# Patient Record
Sex: FEMALE | Race: OTHER | Employment: UNEMPLOYED | URBAN - METROPOLITAN AREA
[De-identification: names, ages, dates, MRNs, and addresses within clinical notes are randomized per-mention and may not be internally consistent; named-entity substitution may affect disease eponyms.]

---

## 2021-09-16 ENCOUNTER — HOSPITAL ENCOUNTER (EMERGENCY)
Age: 31
Discharge: HOME | End: 2021-09-17
Attending: EMERGENCY MEDICINE | Admitting: EMERGENCY MEDICINE

## 2021-09-16 ENCOUNTER — APPOINTMENT (OUTPATIENT)
Dept: CT IMAGING | Age: 31
End: 2021-09-16
Attending: EMERGENCY MEDICINE

## 2021-09-16 DIAGNOSIS — N20.0 KIDNEY STONE: Primary | ICD-10-CM

## 2021-09-16 DIAGNOSIS — R10.9 ACUTE RIGHT FLANK PAIN: ICD-10-CM

## 2021-09-16 DIAGNOSIS — R11.2 NON-INTRACTABLE VOMITING WITH NAUSEA, UNSPECIFIED VOMITING TYPE: ICD-10-CM

## 2021-09-16 LAB
ALBUMIN SERPL-MCNC: 3.1 G/DL (ref 3.5–5)
ALBUMIN/GLOB SERPL: 0.7 {RATIO} (ref 1.1–2.2)
ALP SERPL-CCNC: 102 U/L (ref 45–117)
ALT SERPL-CCNC: 12 U/L (ref 12–78)
ANION GAP SERPL CALC-SCNC: 5 MMOL/L (ref 5–15)
APPEARANCE UR: ABNORMAL
AST SERPL-CCNC: 16 U/L (ref 15–37)
BACTERIA URNS QL MICRO: NEGATIVE /HPF
BASOPHILS # BLD: 0.1 K/UL (ref 0–0.1)
BASOPHILS NFR BLD: 1 % (ref 0–1)
BILIRUB SERPL-MCNC: 0.2 MG/DL (ref 0.2–1)
BILIRUB UR QL: NEGATIVE
BUN SERPL-MCNC: 11 MG/DL (ref 6–20)
BUN/CREAT SERPL: 15 (ref 12–20)
CALCIUM SERPL-MCNC: 7.5 MG/DL (ref 8.5–10.1)
CHLORIDE SERPL-SCNC: 109 MMOL/L (ref 97–108)
CO2 SERPL-SCNC: 26 MMOL/L (ref 21–32)
COLOR UR: ABNORMAL
CREAT SERPL-MCNC: 0.72 MG/DL (ref 0.55–1.02)
DIFFERENTIAL METHOD BLD: ABNORMAL
EOSINOPHIL # BLD: 0.2 K/UL (ref 0–0.4)
EOSINOPHIL NFR BLD: 2 % (ref 0–7)
EPITH CASTS URNS QL MICRO: ABNORMAL /LPF
ERYTHROCYTE [DISTWIDTH] IN BLOOD BY AUTOMATED COUNT: 16.2 % (ref 11.5–14.5)
GLOBULIN SER CALC-MCNC: 4.6 G/DL (ref 2–4)
GLUCOSE SERPL-MCNC: 103 MG/DL (ref 65–100)
GLUCOSE UR STRIP.AUTO-MCNC: NEGATIVE MG/DL
HCG UR QL: NEGATIVE
HCT VFR BLD AUTO: 36.9 % (ref 35–47)
HGB BLD-MCNC: 11.4 G/DL (ref 11.5–16)
HGB UR QL STRIP: ABNORMAL
IMM GRANULOCYTES # BLD AUTO: 0 K/UL (ref 0–0.04)
IMM GRANULOCYTES NFR BLD AUTO: 0 % (ref 0–0.5)
KETONES UR QL STRIP.AUTO: NEGATIVE MG/DL
LEUKOCYTE ESTERASE UR QL STRIP.AUTO: ABNORMAL
LIPASE SERPL-CCNC: 112 U/L (ref 73–393)
LYMPHOCYTES # BLD: 2.8 K/UL (ref 0.8–3.5)
LYMPHOCYTES NFR BLD: 28 % (ref 12–49)
MCH RBC QN AUTO: 22.5 PG (ref 26–34)
MCHC RBC AUTO-ENTMCNC: 30.9 G/DL (ref 30–36.5)
MCV RBC AUTO: 72.9 FL (ref 80–99)
MONOCYTES # BLD: 0.8 K/UL (ref 0–1)
MONOCYTES NFR BLD: 7 % (ref 5–13)
NEUTS SEG # BLD: 6.3 K/UL (ref 1.8–8)
NEUTS SEG NFR BLD: 62 % (ref 32–75)
NITRITE UR QL STRIP.AUTO: NEGATIVE
NRBC # BLD: 0 K/UL (ref 0–0.01)
NRBC BLD-RTO: 0 PER 100 WBC
PH UR STRIP: 7 [PH] (ref 5–8)
PLATELET # BLD AUTO: 347 K/UL (ref 150–400)
PMV BLD AUTO: 10.1 FL (ref 8.9–12.9)
POTASSIUM SERPL-SCNC: 3.3 MMOL/L (ref 3.5–5.1)
PROT SERPL-MCNC: 7.7 G/DL (ref 6.4–8.2)
PROT UR STRIP-MCNC: 30 MG/DL
RBC # BLD AUTO: 5.06 M/UL (ref 3.8–5.2)
RBC #/AREA URNS HPF: >100 /HPF (ref 0–5)
SODIUM SERPL-SCNC: 140 MMOL/L (ref 136–145)
SP GR UR REFRACTOMETRY: 1.02 (ref 1–1.03)
UR CULT HOLD, URHOLD: NORMAL
UROBILINOGEN UR QL STRIP.AUTO: 1 EU/DL (ref 0.2–1)
WBC # BLD AUTO: 10.1 K/UL (ref 3.6–11)
WBC URNS QL MICRO: ABNORMAL /HPF (ref 0–4)

## 2021-09-16 PROCEDURE — 74176 CT ABD & PELVIS W/O CONTRAST: CPT

## 2021-09-16 PROCEDURE — 99285 EMERGENCY DEPT VISIT HI MDM: CPT

## 2021-09-16 PROCEDURE — 80053 COMPREHEN METABOLIC PANEL: CPT

## 2021-09-16 PROCEDURE — 96374 THER/PROPH/DIAG INJ IV PUSH: CPT

## 2021-09-16 PROCEDURE — 81001 URINALYSIS AUTO W/SCOPE: CPT

## 2021-09-16 PROCEDURE — 81025 URINE PREGNANCY TEST: CPT

## 2021-09-16 PROCEDURE — 74011250636 HC RX REV CODE- 250/636: Performed by: EMERGENCY MEDICINE

## 2021-09-16 PROCEDURE — 85025 COMPLETE CBC W/AUTO DIFF WBC: CPT

## 2021-09-16 PROCEDURE — 96375 TX/PRO/DX INJ NEW DRUG ADDON: CPT

## 2021-09-16 PROCEDURE — 36415 COLL VENOUS BLD VENIPUNCTURE: CPT

## 2021-09-16 PROCEDURE — 96376 TX/PRO/DX INJ SAME DRUG ADON: CPT

## 2021-09-16 PROCEDURE — 83690 ASSAY OF LIPASE: CPT

## 2021-09-16 RX ORDER — HYDROMORPHONE HYDROCHLORIDE 1 MG/ML
1 INJECTION, SOLUTION INTRAMUSCULAR; INTRAVENOUS; SUBCUTANEOUS ONCE
Status: COMPLETED | OUTPATIENT
Start: 2021-09-16 | End: 2021-09-16

## 2021-09-16 RX ORDER — TAMSULOSIN HYDROCHLORIDE 0.4 MG/1
0.4 CAPSULE ORAL DAILY
Qty: 7 CAPSULE | Refills: 0 | Status: SHIPPED | OUTPATIENT
Start: 2021-09-16 | End: 2021-09-23

## 2021-09-16 RX ORDER — FENTANYL CITRATE 50 UG/ML
50 INJECTION, SOLUTION INTRAMUSCULAR; INTRAVENOUS ONCE
Status: COMPLETED | OUTPATIENT
Start: 2021-09-16 | End: 2021-09-16

## 2021-09-16 RX ORDER — HYDROCODONE BITARTRATE AND ACETAMINOPHEN 5; 325 MG/1; MG/1
1 TABLET ORAL
Qty: 10 TABLET | Refills: 0 | Status: SHIPPED | OUTPATIENT
Start: 2021-09-16 | End: 2021-09-19

## 2021-09-16 RX ORDER — KETOROLAC TROMETHAMINE 30 MG/ML
15 INJECTION, SOLUTION INTRAMUSCULAR; INTRAVENOUS
Status: COMPLETED | OUTPATIENT
Start: 2021-09-16 | End: 2021-09-16

## 2021-09-16 RX ORDER — ONDANSETRON 4 MG/1
4 TABLET, ORALLY DISINTEGRATING ORAL
Qty: 10 TABLET | Refills: 0 | Status: SHIPPED | OUTPATIENT
Start: 2021-09-16 | End: 2022-01-12 | Stop reason: ALTCHOICE

## 2021-09-16 RX ORDER — ONDANSETRON 2 MG/ML
4 INJECTION INTRAMUSCULAR; INTRAVENOUS
Status: COMPLETED | OUTPATIENT
Start: 2021-09-16 | End: 2021-09-16

## 2021-09-16 RX ADMIN — ONDANSETRON 4 MG: 2 INJECTION INTRAMUSCULAR; INTRAVENOUS at 21:09

## 2021-09-16 RX ADMIN — HYDROMORPHONE HYDROCHLORIDE 1 MG: 1 INJECTION, SOLUTION INTRAMUSCULAR; INTRAVENOUS; SUBCUTANEOUS at 23:02

## 2021-09-16 RX ADMIN — SODIUM CHLORIDE 1000 ML: 9 INJECTION, SOLUTION INTRAVENOUS at 21:29

## 2021-09-16 RX ADMIN — KETOROLAC TROMETHAMINE 15 MG: 30 INJECTION, SOLUTION INTRAMUSCULAR at 21:08

## 2021-09-16 RX ADMIN — FENTANYL CITRATE 50 MCG: 50 INJECTION INTRAMUSCULAR; INTRAVENOUS at 21:09

## 2021-09-17 VITALS
SYSTOLIC BLOOD PRESSURE: 107 MMHG | TEMPERATURE: 98.3 F | WEIGHT: 170 LBS | HEART RATE: 73 BPM | RESPIRATION RATE: 17 BRPM | DIASTOLIC BLOOD PRESSURE: 79 MMHG | HEIGHT: 60 IN | BODY MASS INDEX: 33.38 KG/M2 | OXYGEN SATURATION: 97 %

## 2021-09-17 NOTE — ED NOTES
Dr. Melodie Francis reviewed discharge instructions with the patient. The patient verbalized understanding. The patient was given opportunity for questions. Patient discharged in stable condition to the waiting room via ambulatory with male visitor.

## 2021-09-17 NOTE — ED PROVIDER NOTES
64-year-old female with a history of prior kidney stones that have been able to pass without issue or operative management previously presents to the emergency department noting the acute onset of right flank pain radiating down to her groin with associated nausea and vomiting which started about 30 minutes prior to arrival.  States that she was feeling fine prior to the onset of her symptoms. She rates her pain as 10/10 in severity and on arrival she is in distress due to pain. Denies any fever, chills, dysuria, diarrhea, blood in stool or any noted blood in urine. Denies any other URI symptoms. The history is provided by the patient. The history is limited by a language barrier. A  was used. No past medical history on file. No past surgical history on file. No family history on file. Social History     Socioeconomic History    Marital status:      Spouse name: Not on file    Number of children: Not on file    Years of education: Not on file    Highest education level: Not on file   Occupational History    Not on file   Tobacco Use    Smoking status: Not on file   Substance and Sexual Activity    Alcohol use: Not on file    Drug use: Not on file    Sexual activity: Not on file   Other Topics Concern    Not on file   Social History Narrative    Not on file     Social Determinants of Health     Financial Resource Strain:     Difficulty of Paying Living Expenses:    Food Insecurity:     Worried About Running Out of Food in the Last Year:     [address] in the Last Year:    Transportation Needs:     Lack of Transportation (Medical):      Lack of Transportation (Non-Medical):    Physical Activity:     Days of Exercise per Week:     Minutes of Exercise per Session:    Stress:     Feeling of Stress :    Social Connections:     Frequency of Communication with Friends and Family:     Frequency of Social Gatherings with Friends and Family:     Attends Congregational Services:     Active Member of Clubs or Organizations:     Attends Club or Organization Meetings:     Marital Status:    Intimate Partner Violence:     Fear of Current or Ex-Partner:     Emotionally Abused:     Physically Abused:     Sexually Abused: ALLERGIES: Ibuprofen    Review of Systems   Constitutional: Positive for activity change and appetite change. Negative for chills and fever. HENT: Negative for congestion, rhinorrhea, sinus pressure, sneezing and sore throat. Eyes: Negative for photophobia and visual disturbance. Respiratory: Negative for cough and shortness of breath. Cardiovascular: Negative for chest pain. Gastrointestinal: Positive for abdominal pain, nausea and vomiting. Negative for blood in stool, constipation and diarrhea. Genitourinary: Positive for flank pain. Negative for difficulty urinating, dysuria, frequency, hematuria, menstrual problem, urgency, vaginal bleeding and vaginal discharge. Musculoskeletal: Negative for arthralgias, back pain, myalgias and neck pain. Skin: Negative for rash and wound. Neurological: Negative for syncope, weakness, numbness and headaches. Psychiatric/Behavioral: Negative for self-injury and suicidal ideas. All other systems reviewed and are negative. Vitals:    09/16/21 2140 09/16/21 2154 09/16/21 2200 09/16/21 2245   BP: (!) 133/94 111/76 124/68 130/81   Pulse:  81     Resp:  16     Temp:  98.4 °F (36.9 °C)     SpO2: 99% 100% 100% 100%   Weight:       Height:                Physical Exam  Vitals and nursing note reviewed. Constitutional:       General: She is in acute distress (Due to pain). Appearance: Normal appearance. She is well-developed. She is not diaphoretic. HENT:      Head: Normocephalic and atraumatic. Nose: Nose normal.   Eyes:      Extraocular Movements: Extraocular movements intact.       Conjunctiva/sclera: Conjunctivae normal.      Pupils: Pupils are equal, round, and reactive to light. Cardiovascular:      Rate and Rhythm: Normal rate and regular rhythm. Heart sounds: Normal heart sounds. Pulmonary:      Effort: Pulmonary effort is normal.      Breath sounds: Normal breath sounds. Abdominal:      General: There is no distension. Palpations: Abdomen is soft. Tenderness: There is abdominal tenderness in the right lower quadrant and suprapubic area. There is right CVA tenderness and guarding. There is no left CVA tenderness or rebound. Positive signs include McBurney's sign. Negative signs include Clemens's sign. Musculoskeletal:         General: No tenderness. Cervical back: Neck supple. Skin:     General: Skin is warm and dry. Neurological:      General: No focal deficit present. Mental Status: She is alert and oriented to person, place, and time. Cranial Nerves: No cranial nerve deficit. Sensory: No sensory deficit. Motor: No weakness. Coordination: Coordination normal.          MDM   41-year-old female with a history of prior kidney stones presents with concern for the same. She is afebrile with vital signs stable initially and distress due to pain. Treated symptomatically and had significant improvement in her symptoms. hCG negative  UA returned showing large blood, moderate leuk esterase, 10-20 WBCs but no bacteria. Labs returned showing no leukocytosis, normal creatinine, and LFTs. CT abdomen pelvis shows 7 mm calculus in the distal right ureter with moderate right hydroureteronephrosis. Given slightly abnormal UA results and large size of kidney stone her case was discussed with Dr. Alfredo Cruz,, urology. Who recommends close follow-up in clinic and hopefully the stone will pass on its own., as she is feeling significantly better after medications feel that she can be discharged home. Does not feel that she requires antibiotics at this time.   Will Rx Norco, Zofran, Flomax and recommended close urology follow-up. Return precautions were given for worsening or concerns. This plan was discussed with the patient and her  at the bedside and they stated both understanding and agreement.   Procedures

## 2021-09-17 NOTE — ED TRIAGE NOTES
Pt wheeled into room 22 for triage d/t intense pain. Pt states that about 30 minutes ago she was drinking a cup of coffee when she began having R sided flank pain. She rates her pain at 10/10 with nausea and vomiting. She states she has a hx of kidney stones and states she believes it's the same.

## 2021-09-17 NOTE — ED NOTES
This is a 75-year-old female patient who came into the ER with a complaint of right sided flank pain that started today at approximately 2000 today.

## 2022-01-11 ENCOUNTER — VIRTUAL VISIT (OUTPATIENT)
Dept: FAMILY MEDICINE CLINIC | Age: 32
End: 2022-01-11

## 2022-01-11 DIAGNOSIS — M79.641 PAIN OF RIGHT HAND: Primary | ICD-10-CM

## 2022-01-11 PROCEDURE — 99442 PR PHYS/QHP TELEPHONE EVALUATION 11-20 MIN: CPT | Performed by: NURSE PRACTITIONER

## 2022-01-11 NOTE — PATIENT INSTRUCTIONS
Carpal Tunnel Syndrome: Care Instructions  Overview     Carpal tunnel syndrome is numbness, tingling, weakness, and pain in your hand, wrist, and sometimes forearm. It is caused by pressure on the median nerve. This nerve and several tough tissues called tendons run through a space in the wrist. This space is called the carpal tunnel. The repeated hand motions used in work and some hobbies and sports can put pressure on the median nerve. Pregnancy can cause carpal tunnel syndrome. Several conditions, such as diabetes, arthritis, and an underactive thyroid, can also cause it. You may be able to limit an activity or change the way you do it to reduce your symptoms. You also can take other steps to feel better. If your symptoms are mild, 1 to 2 weeks of home treatment are likely to ease your pain. Surgery is needed only if other treatments do not work. Follow-up care is a key part of your treatment and safety. Be sure to make and go to all appointments, and call your doctor if you are having problems. It's also a good idea to know your test results and keep a list of the medicines you take. How can you care for yourself at home? · If possible, stop or reduce the activity that causes your symptoms. If you cannot stop the activity, take frequent breaks to rest and stretch or change hand positions to do a task. Try switching hands, such as when using a computer mouse. · Try to avoid bending or twisting your wrists. · Ask your doctor if you can take an over-the-counter pain medicine, such as acetaminophen (Tylenol), ibuprofen (Advil, Motrin), or naproxen (Aleve). Be safe with medicines. Read and follow all instructions on the label. · If your doctor prescribes corticosteroid medicine to help reduce pain and swelling, take it exactly as prescribed. Call your doctor if you think you are having a problem with your medicine. · Put ice or a cold pack on your wrist for 10 to 20 minutes at a time to ease pain.  Put a thin cloth between the ice and your skin. · If your doctor or your physical or occupational therapist tells you to wear a wrist splint, wear it as directed to keep your wrist in a neutral position. This also eases pressure on your median nerve. · Ask your doctor whether you should have physical or occupational therapy to learn how to do tasks differently. · Try a yoga class to stretch your muscles and build strength in your hands and wrists. Yoga has been shown to ease carpal tunnel symptoms. To prevent carpal tunnel  · When working at a computer, keep your hands and wrists in line with your forearms. Hold your elbows close to your sides. Take a break every 10 to 15 minutes. · Try these exercises:  ? Warm up: Rotate your wrist up, down, and from side to side. Repeat this 4 times. Stretch your fingers far apart, relax them, then stretch them again. Repeat 4 times. Stretch your thumb by pulling it back gently, holding it, and then releasing it. Repeat 4 times. ? Prayer stretch: Start with your palms together in front of your chest just below your chin. Slowly lower your hands toward your waistline while keeping your hands close to your stomach and your palms together until you feel a mild to moderate stretch under your forearms. Hold for 10 to 20 seconds. Repeat 4 times. ? Wrist flexor stretch: Hold your arm in front of you with your palm up. Bend your wrist, pointing your hand toward the floor. With your other hand, gently bend your wrist further until you feel a mild to moderate stretch in your forearm. Hold for 10 to 20 seconds. Repeat 4 times. ? Wrist extensor stretch: Repeat the steps for the wrist flexor stretch, but begin with your extended hand palm down. · Squeeze a rubber exercise ball several times a day to keep your hands and fingers strong. · Avoid holding objects (such as a book) in one position for a long time. When possible, use your whole hand to grasp an object.  Using just the thumb and index finger can put stress on the wrist.  · Do not smoke. It can make this condition worse by reducing blood flow to the median nerve. If you need help quitting, talk to your doctor about stop-smoking programs and medicines. These can increase your chances of quitting for good. When should you call for help? Watch closely for changes in your health, and be sure to contact your doctor if:    · Your pain or other problems do not get better with home care.     · You want more information about physical or occupational therapy.     · You have side effects of your corticosteroid medicine, such as:  ? Weight gain. ? Mood changes. ? Trouble sleeping. ? Bruising easily.     · You have any other problems with your medicine. Where can you learn more? Go to http://www.gray.com/  Enter R432 in the search box to learn more about \"Carpal Tunnel Syndrome: Care Instructions. \"  Current as of: July 1, 2021               Content Version: 13.0  © 6984-4860 [employer]. Care instructions adapted under license by [employer] (which disclaims liability or warranty for this information). If you have questions about a medical condition or this instruction, always ask your healthcare professional. [place] [address] any warranty or liability for your use of this information.

## 2022-01-11 NOTE — PROGRESS NOTES
: Queen Issac  Patient identification verified with 2 identifiers. Consent: She and/or health care decision maker has provided verbal consent to proceed: Yes   Total Time: minutes: 11-20 minutes  Pursuant to the emergency declaration under the [address], [facility] authority and the [employer] Act, this Virtual Telephone Visit was conducted to reduce the patient's risk of exposure to COVID-19. Assessment/Plan:   Diagnoses and all orders for this visit:    1. Pain of right hand      Follow-up and Dispositions    · Return for F2F 2 weeks any provider (arm/hand pain/numbness); wait list for [facility]'s pap smear clinic.     -[facility] pap smear clinic      Subjective:   Soledad Awan is a 33 y.o. female evaluated via telephone on 1/11/2022. Chief Complaint   Patient presents with    Arm Pain     Pt c/o right arm pain x 2 years with numbness. Patient works as a     Well Woman     Pt is requesting pap-smear information. Last time was done 7 years ago      History of Present Illness  Began 2 years ago. Starts in the shoulder, travels to her hand. Right side. When she sleeps, can't sleep on this side. Sleeps on the left side. Also happens when she is holding the phone. Has the pain in the arm all the time. There is no good position for this. Bothers her sleep and also with work. She has used creams (Cofal) which don't help. When she tightens her hand she has a little relief. No back pain currently. Pain is currently in the hand. Objective:     Current Outpatient Medications   Medication Sig    ondansetron (Zofran ODT) 4 mg disintegrating tablet Take 1 Tablet by mouth every eight (8) hours as needed for Nausea. No current facility-administered medications for this visit.    Carpal tunnel wrist splint  Tylenol 500 mg bid  F2F 2 weeks any provider (arm/hand pain/numbness)    No physical exam performed due to telephone visit. I affirm this is a Patient Initiated Episode with a Patient who has not had a related appointment within my department in the past 7 days or scheduled within the next 24 hours. Delano Watt, ANTHONY Fowler expressed understanding and agreed to this plan.

## 2022-01-11 NOTE — PROGRESS NOTES
Tc to the pt 2x to go over the discharge instructions wit the pt. The instructions were texted over to her phone from the cbn phone. The pictures of the wrist splint and Carpal tunnel syndrome instructions were texted. No one answered the pone. No personal PHI was texted. Just the instructions and the photo of the wrist splints were sent in English, per the providers instructions. The phone does not have an VM box set up and there is no opportunity to leave a message. Shara Murphy RN    The pt returned the call. Int # [id number]. Reviewed the instructions for Carpal tunnel with the pt she was re-sent the Carpal tunnel instructions in Czech. The pt confirmed receipt of the instructions on her phone. She was told she is recommended to purchase the splint from her Pharmacy. She may show the Pharmacy staff the picture and they can help her find it. The pt was told the registrar would contact her to schedule her f/u appt. The pt verbalized understanding.  Shara Murphy RN

## 2022-01-25 ENCOUNTER — OFFICE VISIT (OUTPATIENT)
Dept: FAMILY MEDICINE CLINIC | Age: 32
End: 2022-01-25

## 2022-01-25 VITALS
BODY MASS INDEX: 36.11 KG/M2 | SYSTOLIC BLOOD PRESSURE: 124 MMHG | HEIGHT: 58 IN | DIASTOLIC BLOOD PRESSURE: 80 MMHG | HEART RATE: 79 BPM | WEIGHT: 172 LBS | OXYGEN SATURATION: 100 % | TEMPERATURE: 97.7 F

## 2022-01-25 DIAGNOSIS — G89.29 CHRONIC RIGHT SHOULDER PAIN: Primary | ICD-10-CM

## 2022-01-25 DIAGNOSIS — M25.511 CHRONIC RIGHT SHOULDER PAIN: Primary | ICD-10-CM

## 2022-01-25 PROCEDURE — 99214 OFFICE O/P EST MOD 30 MIN: CPT | Performed by: NURSE PRACTITIONER

## 2022-01-25 RX ORDER — PREDNISONE 10 MG/1
TABLET ORAL
Qty: 63 TABLET | Refills: 0 | Status: SHIPPED | OUTPATIENT
Start: 2022-01-25 | End: 2022-06-06 | Stop reason: SINTOL

## 2022-01-25 NOTE — PROGRESS NOTES
Coordination of Care  1. Have you been to the ER, urgent care clinic since your last visit? Hospitalized since your last visit? No    2. Have you seen or consulted any other health care providers outside of the [address] [place] [address] since your last visit? Include any pap smears or colon screening. No    Does the patient need refills? NO    Learning Assessment Complete?  yes  Depression Screening complete in the past 12 months? yes

## 2022-01-25 NOTE — PROGRESS NOTES
An After Visit Summary was printed and given to the patient. Went over instructions on how to take medication - 6 po qd x 3d; 5 po qd x 3d; 4 po qd x 3d; 3 po qd x 3d; 2 po qd x 3d; 1 po qd x 3d - Patient was also Instructed to scheduled a f/u appointment in 3 weeks. Patient verbalized understanding of instructions.   Arnel Mcginnis RN

## 2022-02-22 ENCOUNTER — OFFICE VISIT (OUTPATIENT)
Dept: FAMILY MEDICINE CLINIC | Age: 32
End: 2022-02-22

## 2022-02-22 VITALS
SYSTOLIC BLOOD PRESSURE: 115 MMHG | HEART RATE: 79 BPM | BODY MASS INDEX: 36.98 KG/M2 | OXYGEN SATURATION: 100 % | RESPIRATION RATE: 16 BRPM | HEIGHT: 58 IN | DIASTOLIC BLOOD PRESSURE: 83 MMHG | WEIGHT: 176.2 LBS | TEMPERATURE: 97.8 F

## 2022-02-22 DIAGNOSIS — G56.01 CARPAL TUNNEL SYNDROME OF RIGHT WRIST: ICD-10-CM

## 2022-02-22 DIAGNOSIS — Z23 ENCOUNTER FOR IMMUNIZATION: Primary | ICD-10-CM

## 2022-02-22 PROCEDURE — 99213 OFFICE O/P EST LOW 20 MIN: CPT | Performed by: FAMILY MEDICINE

## 2022-02-22 NOTE — PROGRESS NOTES
Macy Michael is a 33 y.o. female  Chief Complaint   Patient presents with    Follow-up     Chronic R arm pain    Leg Pain     R leg.  Immunization/Injection     Covid-19 booster. Blood pressure 115/83, pulse 79, temperature 97.8 °F (36.6 °C), temperature source Temporal, resp. rate 16, height 4' 9.87\" (1.47 m), weight 176 lb 3.2 oz (79.9 kg), last menstrual period 02/20/2022, SpO2 100 %. 1. Have you been to the ER, urgent care clinic since your last visit? Hospitalized since your last visit? No    2. Have you seen or consulted any other health care providers outside of the [address] [place] [address] since your last visit? Include any pap smears or colon screening.  No

## 2022-02-22 NOTE — PROGRESS NOTES
I have printed AVS and reviewed it with patient today. I explained the process for Access Now to the patient. Patient verbalized understanding. The patient was able to correctly confirm her full name and date of birth prior to the information shared.  #[id number] with [facility] services assisted.  Bobo Best RN

## 2022-02-22 NOTE — PROGRESS NOTES
Sandra Rasheed is a 33 y.o. female   Chief Complaint   Patient presents with    Follow-up     Chronic R arm pain    Leg Pain     R leg.  Immunization/Injection     Covid-19 booster. ASSESSMENT AND PLAN:    1. Carpal tunnel syndrome of right wrist  Referral to neurology to consider EDX studies, injections, etc.    - REFERRAL TO NEUROLOGY      SUBJECTIVE:    HPI:  Sandra Rasheed is a 33 y.o. female who presents for follow-up on RUE pain x 2 years. Patient reports that she occasionally has pain from her shoulder to her fingertips but most of her discomfort is in her hands and from her elbow to her hand. She cannot sleep due to the discomfort. She also cannot keep her arm elevated for long because the discomfort worsens. She cannot take ibuprofen she reports it has caused kidney problems in the past.  She has tried taking tylenol without relief. She got a thumb spica splint but it made the pain worse. She will sometimes wrap a bandanna around her wrist keeping her thumb flat to her hand and that provides some relief but is impractical.  She has noticed some decreased  strength and overall strength to lift things. At her last visit she was prescribed a steroid burst but did not take the pills because she was worried they were too strong and was not confident in the diagnosis. Review of Systems   Constitutional: Negative for fever and malaise/fatigue. Eyes: Negative for blurred vision. Respiratory: Negative for cough and shortness of breath. Cardiovascular: Negative for chest pain, palpitations and leg swelling. Gastrointestinal: Negative for abdominal pain, constipation, diarrhea, nausea and vomiting. Musculoskeletal: Positive for joint pain. Neurological: Positive for tingling, sensory change and focal weakness. Negative for dizziness and headaches.          /83 (BP 1 Location: Left upper arm, BP Patient Position: Sitting, BP Cuff Size: Adult)   Pulse 79   Temp 97.8 °F (36.6 °C) (Temporal)   Resp 16   Ht 4' 9.87\" (1.47 m)   Wt 176 lb 3.2 oz (79.9 kg)   LMP 02/20/2022 (Approximate)   SpO2 100%   BMI 36.99 kg/m²     Physical Exam  Constitutional:       General: She is not in acute distress. Appearance: Normal appearance. Eyes:      Extraocular Movements: Extraocular movements intact. Conjunctiva/sclera: Conjunctivae normal.      Pupils: Pupils are equal, round, and reactive to light. Cardiovascular:      Rate and Rhythm: Normal rate and regular rhythm. Heart sounds: Normal heart sounds. Pulmonary:      Effort: Pulmonary effort is normal.      Breath sounds: Normal breath sounds. Musculoskeletal:      Right shoulder: Normal range of motion. Normal strength. Right elbow: Normal range of motion. No tenderness. Right wrist: Normal range of motion. Normal pulse. Right hand: Normal range of motion. Decreased strength (slightly decreased  strength when compared to left, but still 5/5). Comments: + phalen  + tinel   Neurological:      Mental Status: She is alert.

## 2022-06-06 ENCOUNTER — VIRTUAL VISIT (OUTPATIENT)
Dept: FAMILY MEDICINE CLINIC | Age: 32
End: 2022-06-06

## 2022-06-06 DIAGNOSIS — G56.01 CARPAL TUNNEL SYNDROME OF RIGHT WRIST: Primary | ICD-10-CM

## 2022-06-06 PROCEDURE — 99442 PR PHYS/QHP TELEPHONE EVALUATION 11-20 MIN: CPT | Performed by: FAMILY MEDICINE

## 2022-06-06 RX ORDER — AMITRIPTYLINE HYDROCHLORIDE 10 MG/1
10 TABLET, FILM COATED ORAL
Qty: 30 TABLET | Refills: 0 | Status: SHIPPED | OUTPATIENT
Start: 2022-06-06

## 2022-06-06 RX ORDER — ACETAMINOPHEN 500 MG
500 TABLET ORAL
COMMUNITY

## 2022-06-06 NOTE — PROGRESS NOTES
Juni Alba was spoken to at the time of virtual discharge. Full name and  verified. The After Visit Summary was reviewed along with today's consult to ensure all questions were answered. Instructions were reviewed as well as when it is recommended to come back to our clinic for another appointment. I reviewed the medication list with the patient to ensure she knows how to and when to take her medications. Side effects, mechanisms of action and medication compliance were reiterated to ensure proper understanding. I have reviewed the provider's instructions with the patient, answering all questions to her satisfaction. Patient verbalized understanding.   Ibrahima Silveira RN

## 2022-06-06 NOTE — PROGRESS NOTES
Yany Galaviz (: 1990) is a 33 y.o. female, established patient, Virtual Visit for evaluation of the following chief complaint(s):   Carpal Tunnel (Right. F\U )       ASSESSMENT/PLAN:  1. Carpal tunnel syndrome of right wrist  -     REFERRAL TO ORTHOPEDIC SURGERY  Will refer to Ortho for NCS and intervention. Since she avoids NSAIDs due to renal issues will given trial of Elavil QHS. No follow-ups on file. SUBJECTIVE/OBJECTIVE:  HPI  Reviewed prior note for F2F eval.  Rt arm pain:  Pain from Rt wrist into forearm. Fingers have paresthesias, numbness. May radiate to shoulder and neck at times. Took only 2 days of the Pred taper without relief. Has not heard from AN for referral.    Review of Systems   Neurological: Positive for numbness. Negative for weakness. No data recorded    Physical Exam    On this date 2022 I have spent 11 minutes reviewing previous notes, test results and face to face (virtual) with the patient discussing the diagnosis and importance of compliance with the treatment plan as well as documenting on the day of the visit. Yany Galaviz, was evaluated through a synchronous (real-time) audio-video encounter. The patient (or guardian if applicable) is aware that this is a billable service, which includes applicable co-pays. This Virtual Visit was conducted with patient's (and/or legal guardian's) consent. The visit was conducted pursuant to the emergency declaration under the [address] [place] [address], [address] [place] [address] authority and the [employer] Act. Patient identification was verified, and a caregiver was present when appropriate.   The patient was located at: Home: [address] [id number]  The provider was located at: Home:      Patient identification was verified at the start of the visit: YES    Services were provided through a phone synchronous discussion virtually to substitute for in-person clinic visit. Patient was located at home and provider was located in office or at home. An electronic signature was used to authenticate this note.   -- Dennis Arreola MD

## 2022-06-06 NOTE — PROGRESS NOTES
1. Have you been to the ER, urgent care clinic since your last visit? Hospitalized since your last visit? No    2. Have you seen or consulted any other health care providers outside of the [address] [place] [address] since your last visit? Include any pap smears or colon screening.  No

## 2022-06-09 ENCOUNTER — OFFICE VISIT (OUTPATIENT)
Dept: FAMILY MEDICINE CLINIC | Age: 32
End: 2022-06-09

## 2022-06-09 DIAGNOSIS — Z71.89 COUNSELING AND COORDINATION OF CARE: Primary | ICD-10-CM

## 2022-06-09 PROCEDURE — 99080 SPECIAL REPORTS OR FORMS: CPT | Performed by: NURSE PRACTITIONER

## 2022-06-09 NOTE — PROGRESS NOTES
AN financial screening is complete. Patient has been instructed to call appointment line on or after 6/23/22. Patient has been screened for [facility]. Patient has marked Food (score 3). Resources have been provided.

## 2022-08-01 ENCOUNTER — TELEPHONE (OUTPATIENT)
Dept: FAMILY MEDICINE CLINIC | Age: 32
End: 2022-08-01

## 2022-08-01 NOTE — TELEPHONE ENCOUNTER
Patient said she does not have information about a food bank. OW explained process to get food and sent information to patient via text message after telephone conversation. Score 4.